# Patient Record
Sex: FEMALE | Race: BLACK OR AFRICAN AMERICAN | NOT HISPANIC OR LATINO | Employment: FULL TIME | URBAN - METROPOLITAN AREA
[De-identification: names, ages, dates, MRNs, and addresses within clinical notes are randomized per-mention and may not be internally consistent; named-entity substitution may affect disease eponyms.]

---

## 2018-01-16 ENCOUNTER — HOSPITAL ENCOUNTER (EMERGENCY)
Facility: HOSPITAL | Age: 23
Discharge: HOME | End: 2018-01-16
Attending: EMERGENCY MEDICINE
Payer: MEDICAID

## 2018-01-16 VITALS
OXYGEN SATURATION: 98 % | TEMPERATURE: 99 F | SYSTOLIC BLOOD PRESSURE: 117 MMHG | RESPIRATION RATE: 16 BRPM | BODY MASS INDEX: 28.35 KG/M2 | WEIGHT: 160 LBS | HEART RATE: 95 BPM | DIASTOLIC BLOOD PRESSURE: 67 MMHG | HEIGHT: 63 IN

## 2018-01-16 DIAGNOSIS — R50.9 FEVER, UNSPECIFIED FEVER CAUSE: ICD-10-CM

## 2018-01-16 DIAGNOSIS — B34.9 VIRAL SYNDROME: Primary | ICD-10-CM

## 2018-01-16 LAB
B-HCG UR QL: NEGATIVE
CTP QC/QA: YES
FLUAV AG SPEC QL IA: NEGATIVE
FLUBV AG SPEC QL IA: NEGATIVE
SPECIMEN SOURCE: NORMAL

## 2018-01-16 PROCEDURE — 25000003 PHARM REV CODE 250: Performed by: EMERGENCY MEDICINE

## 2018-01-16 PROCEDURE — 99283 EMERGENCY DEPT VISIT LOW MDM: CPT | Mod: 25

## 2018-01-16 PROCEDURE — 87400 INFLUENZA A/B EACH AG IA: CPT

## 2018-01-16 PROCEDURE — 81025 URINE PREGNANCY TEST: CPT | Performed by: EMERGENCY MEDICINE

## 2018-01-16 RX ORDER — OSELTAMIVIR PHOSPHATE 75 MG/1
75 CAPSULE ORAL 2 TIMES DAILY
Qty: 10 CAPSULE | Refills: 0 | Status: SHIPPED | OUTPATIENT
Start: 2018-01-16 | End: 2018-01-21

## 2018-01-16 RX ORDER — ACETAMINOPHEN 325 MG/1
650 TABLET ORAL
Status: COMPLETED | OUTPATIENT
Start: 2018-01-16 | End: 2018-01-16

## 2018-01-16 RX ADMIN — ACETAMINOPHEN 650 MG: 325 TABLET ORAL at 12:01

## 2018-01-16 NOTE — ED PROVIDER NOTES
Encounter Date: 1/16/2018    SCRIBE #1 NOTE: I, Jaime Dowd, am scribing for, and in the presence of,  Kenan Lund NP. I have scribed the following portions of the note - Other sections scribed: HPI, ROS.       History     Chief Complaint   Patient presents with    Generalized Body Aches     fever, chills, body aches, vomited this am x 1, sore throat;      CC: Generalized Body Aches; Fevers    HPI: This 22 y.o. female presents to the ED c/o fevers, generalized body aches, sore throat, and chills that began 2 days ago accompanied by x1 episode of vomiting 1 day ago early in the morning. Pt has attempted treatment with Tylenol, with temporary reduction to symptoms. Symptoms are acute in onset and severe (8/10). Pt denies any coughs, abdominal pain, nausea, or any other associated symptoms. Pt has no modifying factors.       The history is provided by the patient. No  was used.     Review of patient's allergies indicates:  No Known Allergies  History reviewed. No pertinent past medical history.  No past surgical history on file.  No family history on file.  Social History   Substance Use Topics    Smoking status: Never Smoker    Smokeless tobacco: Not on file    Alcohol use Yes      Comment: occ     Review of Systems   Constitutional: Positive for chills and fever.   HENT: Positive for sore throat.    Respiratory: Negative for cough and shortness of breath.    Cardiovascular: Negative for chest pain.   Gastrointestinal: Positive for vomiting (x1 episode). Negative for abdominal pain and nausea.   Genitourinary: Negative for dysuria.   Musculoskeletal: Positive for myalgias (Generalized body aches). Negative for back pain.   Skin: Negative for rash.   Neurological: Negative for weakness.       Physical Exam     Initial Vitals [01/16/18 0017]   BP Pulse Resp Temp SpO2   127/72 (!) 123 14 (!) 102.9 °F (39.4 °C) 99 %      MAP       90.33         Physical Exam    Nursing note and vitals  reviewed.  Constitutional: She appears well-developed and well-nourished. She is not diaphoretic.  Non-toxic appearance. She does not have a sickly appearance. She does not appear ill. No distress.   HENT:   Head: Normocephalic and atraumatic.   Right Ear: Tympanic membrane, external ear and ear canal normal.   Left Ear: Tympanic membrane, external ear and ear canal normal.   Nose: Nose normal.   Mouth/Throat: Uvula is midline, oropharynx is clear and moist and mucous membranes are normal. No oropharyngeal exudate, posterior oropharyngeal edema, posterior oropharyngeal erythema or tonsillar abscesses.   Eyes: Conjunctivae and EOM are normal. Pupils are equal, round, and reactive to light. Right eye exhibits no discharge. Left eye exhibits no discharge. No scleral icterus.   Neck: Normal range of motion. Neck supple. No thyromegaly present. No tracheal deviation present. No JVD present.   Cardiovascular: Regular rhythm, S1 normal, S2 normal, normal heart sounds and intact distal pulses. Tachycardia present.  Exam reveals no gallop and no friction rub.    No murmur heard.  Pulmonary/Chest: Breath sounds normal. No stridor. No respiratory distress. She has no wheezes. She has no rhonchi. She has no rales.   Abdominal: Soft. Bowel sounds are normal. She exhibits no distension and no mass. There is no tenderness. There is no rebound and no guarding.   Musculoskeletal: Normal range of motion. She exhibits no edema or tenderness.   Lymphadenopathy:     She has no cervical adenopathy.   Neurological: She is alert and oriented to person, place, and time. She has normal strength and normal reflexes. She displays normal reflexes. No cranial nerve deficit or sensory deficit.   Skin: Skin is warm and dry. No rash and no abscess noted. No erythema. No pallor.   Psychiatric: She has a normal mood and affect. Her behavior is normal. Judgment and thought content normal.         ED Course   Procedures  Labs Reviewed   INFLUENZA A AND  B ANTIGEN   POCT URINE PREGNANCY             Medical Decision Making:   Differential Diagnosis:   Includes PTA, influenza, otitis media, otitis externa, pharyngitis, bronchitis, meningitis, pneumonia, sepsis, others  Clinical Tests:   Lab Tests: Reviewed and Ordered  ED Management:  22-year-old female presenting for evaluation of sore throat, generalized myalgias, malaise, fever, and chills. Patient states that her symptoms began 2 days ago and have been continuous. Patient was treated with Tylenol in the ED waiting room while waiting to be seen and states that her symptoms are markedly improved following this treatment. Patient is afebrile, well-appearing, and in no distress. TMs and auditory canals are normal bilaterally. There is no oropharyngeal erythema, edema, or exudate. Uvula is midline. No evidence of PTA. Lung sounds are clear bilaterally in all fields. Abdomen is soft and nontender without rigidity or guarding. Influenza swab is negative, however given currently spread prevalence of influenza I suspect this is likely the cause of the patient's symptoms. Repeat vitals are improved. Heart rate and temperature have decreased. Doubt sepsis. Prescribed Tamiflu. Instructed patient to use ibuprofen and acetaminophen for fever. Recommended outpatient follow-up with her PCP. ED return precautions given. Patient expressed understanding of diagnosis and discharge instructions.  Other:   I have discussed this case with another health care provider.       <> Summary of the Discussion: Case discussed with my attending physician who agreed with the assessment and plan.            Scribe Attestation:   Scribe #1: I performed the above scribed service and the documentation accurately describes the services I performed. I attest to the accuracy of the note.    Attending Attestation:           Physician Attestation for Scribe:  Physician Attestation Statement for Scribe #1: I, Kenan Lund NP, reviewed documentation, as  scribed by Jaime Dowd in my presence, and it is both accurate and complete.                 ED Course      Clinical Impression:   The primary encounter diagnosis was Viral syndrome. A diagnosis of Fever, unspecified fever cause was also pertinent to this visit.    Disposition:   Disposition: Discharged  Condition: Stable                        Kenan Lund NP  01/16/18 0332

## 2018-01-16 NOTE — DISCHARGE INSTRUCTIONS
Take Tamiflu twice daily for 5 days. Use Tylenol and ibuprofen to treat fever and body aches.    Follow-up with your primary physician for further evaluation and management.    Return to the emergency department for any new or worsening symptoms.